# Patient Record
Sex: FEMALE | Race: BLACK OR AFRICAN AMERICAN | NOT HISPANIC OR LATINO | URBAN - NONMETROPOLITAN AREA
[De-identification: names, ages, dates, MRNs, and addresses within clinical notes are randomized per-mention and may not be internally consistent; named-entity substitution may affect disease eponyms.]

---

## 2022-03-24 ENCOUNTER — LAB OUTSIDE AN ENCOUNTER (OUTPATIENT)
Dept: URBAN - NONMETROPOLITAN AREA CLINIC 2 | Facility: CLINIC | Age: 62
End: 2022-03-24

## 2022-03-24 ENCOUNTER — OFFICE VISIT (OUTPATIENT)
Dept: URBAN - NONMETROPOLITAN AREA CLINIC 2 | Facility: CLINIC | Age: 62
End: 2022-03-24
Payer: COMMERCIAL

## 2022-03-24 VITALS
BODY MASS INDEX: 42.15 KG/M2 | TEMPERATURE: 97.9 F | SYSTOLIC BLOOD PRESSURE: 129 MMHG | WEIGHT: 253 LBS | HEART RATE: 62 BPM | DIASTOLIC BLOOD PRESSURE: 86 MMHG | HEIGHT: 65 IN

## 2022-03-24 DIAGNOSIS — R10.30 LOWER ABDOMINAL PAIN: ICD-10-CM

## 2022-03-24 DIAGNOSIS — Z12.11 COLON CANCER SCREENING: ICD-10-CM

## 2022-03-24 DIAGNOSIS — R19.4 CHANGE IN BOWEL HABITS: ICD-10-CM

## 2022-03-24 PROCEDURE — 99204 OFFICE O/P NEW MOD 45 MIN: CPT | Performed by: NURSE PRACTITIONER

## 2022-03-24 RX ORDER — HYDROCHLOROTHIAZIDE 50 MG/1
TAKE 1 TABLET BY MOUTH EVERY DAY IN THE MORNING TABLET ORAL
Qty: 90 | Refills: 1 | Status: ACTIVE | COMMUNITY

## 2022-03-24 RX ORDER — LISINOPRIL 10 MG/1
TABLET ORAL
Qty: 90 | Status: ACTIVE | COMMUNITY

## 2022-03-24 RX ORDER — ERGOCALCIFEROL 1.25 MG/1
CAPSULE, LIQUID FILLED ORAL
Qty: 12 | Status: ACTIVE | COMMUNITY

## 2022-03-24 RX ORDER — LOVASTATIN 10 MG/1
TAKE 1 TABLET BY MOUTH EVERY DAY WITH EVENING MEAL TABLET ORAL
Qty: 30 | Refills: 0 | Status: ACTIVE | COMMUNITY

## 2022-03-24 RX ORDER — DICYCLOMINE HYDROCHLORIDE 10 MG/1
TID AC PRN DIARRHEA/CRAMPING CAPSULE ORAL THREE TIMES A DAY
Qty: 270 CAPSULES | Refills: 3 | OUTPATIENT
Start: 2022-03-24 | End: 2023-03-19

## 2022-03-24 NOTE — HPI-TODAY'S VISIT:
3/24/2022 Amy presents for evaluation of lower abdominal pain and diarrhea.  Over the past 3 months she has had increased urgency with loose stools.  Prior to this she had a bowel movement daily.  Her last colonoscopy was in 2012 by Dr. Hastings and normal with no diverticular disease.  Dr. Venegas is her primary care physician, she does not see him for dialysis or renal disease.  He ordered labs and a CT scan which was done last week.  She states that these are normal to her knowledge.  She denies any fevers associated with the pain.  She cannot identify any specific triggers.  She has been taking ibuprofen with no relief.  Today she has multiple GI complaints.  MB

## 2022-03-26 LAB
A/G RATIO: 1.3
ALBUMIN: 4.4
ALKALINE PHOSPHATASE: 115
ALT (SGPT): 17
AST (SGOT): 19
BASO (ABSOLUTE): 0
BASOS: 0
BILIRUBIN, TOTAL: 0.3
BUN/CREATININE RATIO: 13
BUN: 14
C-REACTIVE PROTEIN, QUANT: 3
CALCIUM: 9.8
CARBON DIOXIDE, TOTAL: 25
CHLORIDE: 97
CREATININE: 1.08
EGFR: 58
EOS (ABSOLUTE): 0.1
EOS: 2
GLOBULIN, TOTAL: 3.3
GLUCOSE: 91
HEMATOCRIT: 48.1
HEMATOLOGY COMMENTS:: (no result)
HEMOGLOBIN: 15.6
IMMATURE CELLS: (no result)
IMMATURE GRANS (ABS): 0
IMMATURE GRANULOCYTES: 0
LYMPHS (ABSOLUTE): 1.9
LYMPHS: 36
MCH: 27.4
MCHC: 32.4
MCV: 84
MONOCYTES(ABSOLUTE): 0.4
MONOCYTES: 8
NEUTROPHILS (ABSOLUTE): 2.8
NEUTROPHILS: 54
NRBC: (no result)
PLATELETS: 300
POTASSIUM: 3.9
PROTEIN, TOTAL: 7.7
RBC: 5.7
RDW: 13
SEDIMENTATION RATE-WESTERGREN: 53
SODIUM: 138
WBC: 5.2

## 2022-03-28 ENCOUNTER — TELEPHONE ENCOUNTER (OUTPATIENT)
Dept: URBAN - METROPOLITAN AREA CLINIC 92 | Facility: CLINIC | Age: 62
End: 2022-03-28

## 2022-05-05 ENCOUNTER — CLAIMS CREATED FROM THE CLAIM WINDOW (OUTPATIENT)
Dept: URBAN - METROPOLITAN AREA MEDICAL CENTER 1 | Facility: MEDICAL CENTER | Age: 62
End: 2022-05-05

## 2022-05-05 ENCOUNTER — CLAIMS CREATED FROM THE CLAIM WINDOW (OUTPATIENT)
Dept: URBAN - METROPOLITAN AREA MEDICAL CENTER 1 | Facility: MEDICAL CENTER | Age: 62
End: 2022-05-05
Payer: COMMERCIAL

## 2022-05-05 DIAGNOSIS — Z12.11 COLON CANCER SCREENING: ICD-10-CM

## 2022-05-05 DIAGNOSIS — D12.2 ADENOMA OF ASCENDING COLON: ICD-10-CM

## 2022-05-05 PROCEDURE — 45385 COLONOSCOPY W/LESION REMOVAL: CPT | Performed by: INTERNAL MEDICINE

## 2022-06-22 ENCOUNTER — DASHBOARD ENCOUNTERS (OUTPATIENT)
Age: 62
End: 2022-06-22

## 2022-06-22 ENCOUNTER — OFFICE VISIT (OUTPATIENT)
Dept: URBAN - NONMETROPOLITAN AREA CLINIC 2 | Facility: CLINIC | Age: 62
End: 2022-06-22
Payer: COMMERCIAL

## 2022-06-22 ENCOUNTER — WEB ENCOUNTER (OUTPATIENT)
Dept: URBAN - NONMETROPOLITAN AREA CLINIC 2 | Facility: CLINIC | Age: 62
End: 2022-06-22

## 2022-06-22 VITALS
TEMPERATURE: 97.5 F | SYSTOLIC BLOOD PRESSURE: 144 MMHG | BODY MASS INDEX: 42.05 KG/M2 | HEART RATE: 62 BPM | WEIGHT: 252.4 LBS | DIASTOLIC BLOOD PRESSURE: 87 MMHG | HEIGHT: 65 IN

## 2022-06-22 DIAGNOSIS — Z12.11 COLON CANCER SCREENING: ICD-10-CM

## 2022-06-22 DIAGNOSIS — R19.4 CHANGE IN BOWEL HABITS: ICD-10-CM

## 2022-06-22 DIAGNOSIS — R10.30 LOWER ABDOMINAL PAIN: ICD-10-CM

## 2022-06-22 PROBLEM — 305058001: Status: ACTIVE | Noted: 2022-03-24

## 2022-06-22 PROBLEM — 54586004: Status: ACTIVE | Noted: 2022-03-24

## 2022-06-22 PROBLEM — 129851009: Status: ACTIVE | Noted: 2022-03-24

## 2022-06-22 PROCEDURE — 99214 OFFICE O/P EST MOD 30 MIN: CPT | Performed by: INTERNAL MEDICINE

## 2022-06-22 RX ORDER — LOVASTATIN 10 MG/1
TAKE 1 TABLET BY MOUTH EVERY DAY WITH EVENING MEAL TABLET ORAL
Qty: 30 | Refills: 0 | Status: ACTIVE | COMMUNITY

## 2022-06-22 RX ORDER — DICYCLOMINE HYDROCHLORIDE 10 MG/1
TID AC PRN DIARRHEA/CRAMPING CAPSULE ORAL THREE TIMES A DAY
Qty: 270 CAPSULES | Refills: 3 | Status: ACTIVE | COMMUNITY
Start: 2022-03-24 | End: 2023-03-19

## 2022-06-22 RX ORDER — LISINOPRIL 10 MG/1
TABLET ORAL
Qty: 90 | Status: ACTIVE | COMMUNITY

## 2022-06-22 RX ORDER — HYDROCHLOROTHIAZIDE 50 MG/1
TAKE 1 TABLET BY MOUTH EVERY DAY IN THE MORNING TABLET ORAL
Qty: 90 | Refills: 1 | Status: ACTIVE | COMMUNITY

## 2022-06-22 RX ORDER — ERGOCALCIFEROL 1.25 MG/1
CAPSULE, LIQUID FILLED ORAL
Qty: 12 | Status: ACTIVE | COMMUNITY